# Patient Record
Sex: FEMALE | Race: WHITE | HISPANIC OR LATINO
[De-identification: names, ages, dates, MRNs, and addresses within clinical notes are randomized per-mention and may not be internally consistent; named-entity substitution may affect disease eponyms.]

---

## 2020-10-02 ENCOUNTER — APPOINTMENT (OUTPATIENT)
Dept: ANTEPARTUM | Facility: CLINIC | Age: 35
End: 2020-10-02
Payer: COMMERCIAL

## 2020-10-02 PROCEDURE — 76801 OB US < 14 WKS SINGLE FETUS: CPT

## 2020-10-02 PROCEDURE — 76813 OB US NUCHAL MEAS 1 GEST: CPT | Mod: 59

## 2020-10-29 ENCOUNTER — APPOINTMENT (OUTPATIENT)
Dept: ANTEPARTUM | Facility: CLINIC | Age: 35
End: 2020-10-29
Payer: COMMERCIAL

## 2020-10-29 PROCEDURE — 76817 TRANSVAGINAL US OBSTETRIC: CPT

## 2020-10-29 PROCEDURE — 76811 OB US DETAILED SNGL FETUS: CPT

## 2020-10-29 PROCEDURE — 99072 ADDL SUPL MATRL&STAF TM PHE: CPT

## 2020-12-03 ENCOUNTER — APPOINTMENT (OUTPATIENT)
Dept: ANTEPARTUM | Facility: CLINIC | Age: 35
End: 2020-12-03
Payer: COMMERCIAL

## 2020-12-03 PROCEDURE — 76816 OB US FOLLOW-UP PER FETUS: CPT

## 2020-12-03 PROCEDURE — 99072 ADDL SUPL MATRL&STAF TM PHE: CPT

## 2020-12-03 PROCEDURE — 76817 TRANSVAGINAL US OBSTETRIC: CPT

## 2021-01-21 ENCOUNTER — APPOINTMENT (OUTPATIENT)
Dept: ANTEPARTUM | Facility: CLINIC | Age: 36
End: 2021-01-21

## 2021-02-02 ENCOUNTER — APPOINTMENT (OUTPATIENT)
Dept: ANTEPARTUM | Facility: CLINIC | Age: 36
End: 2021-02-02
Payer: COMMERCIAL

## 2021-02-02 PROCEDURE — 76819 FETAL BIOPHYS PROFIL W/O NST: CPT

## 2021-02-02 PROCEDURE — 99072 ADDL SUPL MATRL&STAF TM PHE: CPT

## 2021-02-02 PROCEDURE — 76816 OB US FOLLOW-UP PER FETUS: CPT

## 2021-02-22 PROBLEM — Z00.00 ENCOUNTER FOR PREVENTIVE HEALTH EXAMINATION: Status: ACTIVE | Noted: 2021-02-22

## 2021-03-02 ENCOUNTER — APPOINTMENT (OUTPATIENT)
Dept: ANTEPARTUM | Facility: CLINIC | Age: 36
End: 2021-03-02
Payer: COMMERCIAL

## 2021-03-02 ENCOUNTER — ASOB RESULT (OUTPATIENT)
Age: 36
End: 2021-03-02

## 2021-03-02 PROCEDURE — 76819 FETAL BIOPHYS PROFIL W/O NST: CPT

## 2021-03-02 PROCEDURE — 76816 OB US FOLLOW-UP PER FETUS: CPT

## 2021-03-02 PROCEDURE — 99072 ADDL SUPL MATRL&STAF TM PHE: CPT

## 2021-03-17 ENCOUNTER — ASOB RESULT (OUTPATIENT)
Age: 36
End: 2021-03-17

## 2021-03-17 ENCOUNTER — APPOINTMENT (OUTPATIENT)
Dept: ANTEPARTUM | Facility: CLINIC | Age: 36
End: 2021-03-17
Payer: COMMERCIAL

## 2021-03-17 PROCEDURE — 76819 FETAL BIOPHYS PROFIL W/O NST: CPT

## 2021-03-17 PROCEDURE — 99072 ADDL SUPL MATRL&STAF TM PHE: CPT

## 2021-03-17 PROCEDURE — 76816 OB US FOLLOW-UP PER FETUS: CPT

## 2021-03-25 ENCOUNTER — ASOB RESULT (OUTPATIENT)
Age: 36
End: 2021-03-25

## 2021-03-25 ENCOUNTER — APPOINTMENT (OUTPATIENT)
Dept: ANTEPARTUM | Facility: CLINIC | Age: 36
End: 2021-03-25
Payer: COMMERCIAL

## 2021-03-25 PROCEDURE — 99072 ADDL SUPL MATRL&STAF TM PHE: CPT

## 2021-03-25 PROCEDURE — 76819 FETAL BIOPHYS PROFIL W/O NST: CPT

## 2021-03-25 PROCEDURE — 76816 OB US FOLLOW-UP PER FETUS: CPT

## 2021-03-31 ENCOUNTER — APPOINTMENT (OUTPATIENT)
Dept: ANTEPARTUM | Facility: CLINIC | Age: 36
End: 2021-03-31
Payer: COMMERCIAL

## 2021-03-31 ENCOUNTER — ASOB RESULT (OUTPATIENT)
Age: 36
End: 2021-03-31

## 2021-03-31 PROCEDURE — 76819 FETAL BIOPHYS PROFIL W/O NST: CPT

## 2021-03-31 PROCEDURE — 76816 OB US FOLLOW-UP PER FETUS: CPT

## 2021-03-31 PROCEDURE — 99072 ADDL SUPL MATRL&STAF TM PHE: CPT

## 2021-04-07 ENCOUNTER — APPOINTMENT (OUTPATIENT)
Dept: ANTEPARTUM | Facility: CLINIC | Age: 36
End: 2021-04-07
Payer: COMMERCIAL

## 2021-04-07 ENCOUNTER — ASOB RESULT (OUTPATIENT)
Age: 36
End: 2021-04-07

## 2021-04-07 PROCEDURE — 99072 ADDL SUPL MATRL&STAF TM PHE: CPT

## 2021-04-07 PROCEDURE — 76819 FETAL BIOPHYS PROFIL W/O NST: CPT

## 2021-04-07 PROCEDURE — 76816 OB US FOLLOW-UP PER FETUS: CPT

## 2021-04-09 ENCOUNTER — INPATIENT (INPATIENT)
Facility: HOSPITAL | Age: 36
LOS: 2 days | Discharge: HOME | End: 2021-04-12
Attending: OBSTETRICS & GYNECOLOGY | Admitting: OBSTETRICS & GYNECOLOGY
Payer: COMMERCIAL

## 2021-04-09 DIAGNOSIS — Z3A.00 WEEKS OF GESTATION OF PREGNANCY NOT SPECIFIED: ICD-10-CM

## 2021-04-09 DIAGNOSIS — O26.899 OTHER SPECIFIED PREGNANCY RELATED CONDITIONS, UNSPECIFIED TRIMESTER: ICD-10-CM

## 2021-04-10 ENCOUNTER — RESULT REVIEW (OUTPATIENT)
Age: 36
End: 2021-04-10

## 2021-04-10 ENCOUNTER — TRANSCRIPTION ENCOUNTER (OUTPATIENT)
Age: 36
End: 2021-04-10

## 2021-04-10 VITALS — HEIGHT: 67 IN | WEIGHT: 189.6 LBS

## 2021-04-10 LAB
ALBUMIN SERPL ELPH-MCNC: 3.8 G/DL — SIGNIFICANT CHANGE UP (ref 3.3–5)
ALP SERPL-CCNC: 135 U/L — HIGH (ref 40–120)
ALT FLD-CCNC: 9 U/L — LOW (ref 10–45)
ANION GAP SERPL CALC-SCNC: 14 MMOL/L — SIGNIFICANT CHANGE UP (ref 5–17)
APTT BLD: 24.1 SEC — LOW (ref 27.5–35.5)
AST SERPL-CCNC: 16 U/L — SIGNIFICANT CHANGE UP (ref 10–40)
BASOPHILS # BLD AUTO: 0.05 K/UL — SIGNIFICANT CHANGE UP (ref 0–0.2)
BASOPHILS NFR BLD AUTO: 0.3 % — SIGNIFICANT CHANGE UP (ref 0–2)
BILIRUB SERPL-MCNC: 0.5 MG/DL — SIGNIFICANT CHANGE UP (ref 0.2–1.2)
BLD GP AB SCN SERPL QL: NEGATIVE — SIGNIFICANT CHANGE UP
BUN SERPL-MCNC: 5 MG/DL — LOW (ref 7–23)
CALCIUM SERPL-MCNC: 9 MG/DL — SIGNIFICANT CHANGE UP (ref 8.4–10.5)
CHLORIDE SERPL-SCNC: 102 MMOL/L — SIGNIFICANT CHANGE UP (ref 96–108)
CO2 SERPL-SCNC: 19 MMOL/L — LOW (ref 22–31)
COVID-19 SPIKE DOMAIN AB INTERP: POSITIVE
COVID-19 SPIKE DOMAIN ANTIBODY RESULT: 239 U/ML — HIGH
CREAT ?TM UR-MCNC: 226 MG/DL — SIGNIFICANT CHANGE UP
CREAT SERPL-MCNC: 0.64 MG/DL — SIGNIFICANT CHANGE UP (ref 0.5–1.3)
EOSINOPHIL # BLD AUTO: 0.02 K/UL — SIGNIFICANT CHANGE UP (ref 0–0.5)
EOSINOPHIL NFR BLD AUTO: 0.1 % — SIGNIFICANT CHANGE UP (ref 0–6)
FIBRINOGEN PPP-MCNC: 581 MG/DL — HIGH (ref 258–438)
GLUCOSE SERPL-MCNC: 109 MG/DL — HIGH (ref 70–99)
HCT VFR BLD CALC: 36.8 % — SIGNIFICANT CHANGE UP (ref 34.5–45)
HGB BLD-MCNC: 11.9 G/DL — SIGNIFICANT CHANGE UP (ref 11.5–15.5)
IMM GRANULOCYTES NFR BLD AUTO: 0.5 % — SIGNIFICANT CHANGE UP (ref 0–1.5)
INR BLD: 0.96 — SIGNIFICANT CHANGE UP (ref 0.88–1.16)
LDH SERPL L TO P-CCNC: 206 U/L — SIGNIFICANT CHANGE UP (ref 50–242)
LYMPHOCYTES # BLD AUTO: 11.7 % — LOW (ref 13–44)
LYMPHOCYTES # BLD AUTO: 2.33 K/UL — SIGNIFICANT CHANGE UP (ref 1–3.3)
MCHC RBC-ENTMCNC: 26.1 PG — LOW (ref 27–34)
MCHC RBC-ENTMCNC: 32.3 GM/DL — SIGNIFICANT CHANGE UP (ref 32–36)
MCV RBC AUTO: 80.7 FL — SIGNIFICANT CHANGE UP (ref 80–100)
MONOCYTES # BLD AUTO: 0.88 K/UL — SIGNIFICANT CHANGE UP (ref 0–0.9)
MONOCYTES NFR BLD AUTO: 4.4 % — SIGNIFICANT CHANGE UP (ref 2–14)
NEUTROPHILS # BLD AUTO: 16.59 K/UL — HIGH (ref 1.8–7.4)
NEUTROPHILS NFR BLD AUTO: 83 % — HIGH (ref 43–77)
NRBC # BLD: 0 /100 WBCS — SIGNIFICANT CHANGE UP (ref 0–0)
PLATELET # BLD AUTO: 396 K/UL — SIGNIFICANT CHANGE UP (ref 150–400)
POTASSIUM SERPL-MCNC: 3.4 MMOL/L — LOW (ref 3.5–5.3)
POTASSIUM SERPL-SCNC: 3.4 MMOL/L — LOW (ref 3.5–5.3)
PROT ?TM UR-MCNC: 22 MG/DL — HIGH (ref 0–12)
PROT SERPL-MCNC: 8 G/DL — SIGNIFICANT CHANGE UP (ref 6–8.3)
PROT/CREAT UR-RTO: 0.1 RATIO — SIGNIFICANT CHANGE UP (ref 0–0.2)
PROTHROM AB SERPL-ACNC: 11.5 SEC — SIGNIFICANT CHANGE UP (ref 10.6–13.6)
RBC # BLD: 4.56 M/UL — SIGNIFICANT CHANGE UP (ref 3.8–5.2)
RBC # FLD: 13.9 % — SIGNIFICANT CHANGE UP (ref 10.3–14.5)
RH IG SCN BLD-IMP: POSITIVE — SIGNIFICANT CHANGE UP
RH IG SCN BLD-IMP: POSITIVE — SIGNIFICANT CHANGE UP
SARS-COV-2 IGG+IGM SERPL QL IA: 239 U/ML — HIGH
SARS-COV-2 IGG+IGM SERPL QL IA: POSITIVE
SODIUM SERPL-SCNC: 135 MMOL/L — SIGNIFICANT CHANGE UP (ref 135–145)
T PALLIDUM AB TITR SER: NEGATIVE — SIGNIFICANT CHANGE UP
URATE SERPL-MCNC: 5.9 MG/DL — SIGNIFICANT CHANGE UP (ref 2.5–7)
WBC # BLD: 19.96 K/UL — HIGH (ref 3.8–10.5)
WBC # FLD AUTO: 19.96 K/UL — HIGH (ref 3.8–10.5)

## 2021-04-10 PROCEDURE — 88307 TISSUE EXAM BY PATHOLOGIST: CPT | Mod: 26

## 2021-04-10 RX ORDER — SODIUM CHLORIDE 9 MG/ML
1000 INJECTION, SOLUTION INTRAVENOUS ONCE
Refills: 0 | Status: COMPLETED | OUTPATIENT
Start: 2021-04-10 | End: 2021-04-10

## 2021-04-10 RX ORDER — AER TRAVELER 0.5 G/1
1 SOLUTION RECTAL; TOPICAL EVERY 4 HOURS
Refills: 0 | Status: DISCONTINUED | OUTPATIENT
Start: 2021-04-10 | End: 2021-04-12

## 2021-04-10 RX ORDER — MAGNESIUM HYDROXIDE 400 MG/1
30 TABLET, CHEWABLE ORAL
Refills: 0 | Status: DISCONTINUED | OUTPATIENT
Start: 2021-04-10 | End: 2021-04-12

## 2021-04-10 RX ORDER — GENTAMICIN SULFATE 40 MG/ML
430 VIAL (ML) INJECTION ONCE
Refills: 0 | Status: DISCONTINUED | OUTPATIENT
Start: 2021-04-10 | End: 2021-04-10

## 2021-04-10 RX ORDER — AMPICILLIN TRIHYDRATE 250 MG
CAPSULE ORAL
Refills: 0 | Status: DISCONTINUED | OUTPATIENT
Start: 2021-04-10 | End: 2021-04-11

## 2021-04-10 RX ORDER — BENZOCAINE 10 %
1 GEL (GRAM) MUCOUS MEMBRANE EVERY 6 HOURS
Refills: 0 | Status: DISCONTINUED | OUTPATIENT
Start: 2021-04-10 | End: 2021-04-12

## 2021-04-10 RX ORDER — IBUPROFEN 200 MG
600 TABLET ORAL EVERY 6 HOURS
Refills: 0 | Status: DISCONTINUED | OUTPATIENT
Start: 2021-04-10 | End: 2021-04-12

## 2021-04-10 RX ORDER — HYDROCORTISONE 1 %
1 OINTMENT (GRAM) TOPICAL EVERY 6 HOURS
Refills: 0 | Status: DISCONTINUED | OUTPATIENT
Start: 2021-04-10 | End: 2021-04-12

## 2021-04-10 RX ORDER — SODIUM CHLORIDE 9 MG/ML
1000 INJECTION, SOLUTION INTRAVENOUS
Refills: 0 | Status: DISCONTINUED | OUTPATIENT
Start: 2021-04-10 | End: 2021-04-10

## 2021-04-10 RX ORDER — GENTAMICIN SULFATE 40 MG/ML
350 VIAL (ML) INJECTION ONCE
Refills: 0 | Status: COMPLETED | OUTPATIENT
Start: 2021-04-10 | End: 2021-04-10

## 2021-04-10 RX ORDER — AMPICILLIN TRIHYDRATE 250 MG
2 CAPSULE ORAL EVERY 6 HOURS
Refills: 0 | Status: DISCONTINUED | OUTPATIENT
Start: 2021-04-10 | End: 2021-04-11

## 2021-04-10 RX ORDER — KETOROLAC TROMETHAMINE 30 MG/ML
30 SYRINGE (ML) INJECTION ONCE
Refills: 0 | Status: DISCONTINUED | OUTPATIENT
Start: 2021-04-10 | End: 2021-04-10

## 2021-04-10 RX ORDER — SIMETHICONE 80 MG/1
80 TABLET, CHEWABLE ORAL EVERY 4 HOURS
Refills: 0 | Status: DISCONTINUED | OUTPATIENT
Start: 2021-04-10 | End: 2021-04-12

## 2021-04-10 RX ORDER — DIPHENHYDRAMINE HCL 50 MG
25 CAPSULE ORAL EVERY 6 HOURS
Refills: 0 | Status: DISCONTINUED | OUTPATIENT
Start: 2021-04-10 | End: 2021-04-12

## 2021-04-10 RX ORDER — IBUPROFEN 200 MG
1 TABLET ORAL
Qty: 0 | Refills: 0 | DISCHARGE
Start: 2021-04-10

## 2021-04-10 RX ORDER — AMPICILLIN TRIHYDRATE 250 MG
2 CAPSULE ORAL ONCE
Refills: 0 | Status: COMPLETED | OUTPATIENT
Start: 2021-04-10 | End: 2021-04-10

## 2021-04-10 RX ORDER — FENTANYL/BUPIVACAINE/NS/PF 2MCG/ML-.1
250 PLASTIC BAG, INJECTION (ML) INJECTION
Refills: 0 | Status: DISCONTINUED | OUTPATIENT
Start: 2021-04-10 | End: 2021-04-10

## 2021-04-10 RX ORDER — SODIUM CHLORIDE 9 MG/ML
3 INJECTION INTRAMUSCULAR; INTRAVENOUS; SUBCUTANEOUS EVERY 8 HOURS
Refills: 0 | Status: DISCONTINUED | OUTPATIENT
Start: 2021-04-10 | End: 2021-04-12

## 2021-04-10 RX ORDER — IBUPROFEN 200 MG
600 TABLET ORAL EVERY 6 HOURS
Refills: 0 | Status: COMPLETED | OUTPATIENT
Start: 2021-04-10 | End: 2022-03-09

## 2021-04-10 RX ORDER — TETANUS TOXOID, REDUCED DIPHTHERIA TOXOID AND ACELLULAR PERTUSSIS VACCINE, ADSORBED 5; 2.5; 8; 8; 2.5 [IU]/.5ML; [IU]/.5ML; UG/.5ML; UG/.5ML; UG/.5ML
0.5 SUSPENSION INTRAMUSCULAR ONCE
Refills: 0 | Status: DISCONTINUED | OUTPATIENT
Start: 2021-04-10 | End: 2021-04-12

## 2021-04-10 RX ORDER — ACETAMINOPHEN 500 MG
975 TABLET ORAL
Refills: 0 | Status: DISCONTINUED | OUTPATIENT
Start: 2021-04-10 | End: 2021-04-12

## 2021-04-10 RX ORDER — OXYTOCIN 10 UNIT/ML
333.33 VIAL (ML) INJECTION
Qty: 20 | Refills: 0 | Status: DISCONTINUED | OUTPATIENT
Start: 2021-04-10 | End: 2021-04-12

## 2021-04-10 RX ORDER — BENZOCAINE 10 %
1 GEL (GRAM) MUCOUS MEMBRANE
Qty: 0 | Refills: 0 | DISCHARGE
Start: 2021-04-10

## 2021-04-10 RX ORDER — OXYTOCIN 10 UNIT/ML
333.33 VIAL (ML) INJECTION
Qty: 20 | Refills: 0 | Status: DISCONTINUED | OUTPATIENT
Start: 2021-04-10 | End: 2021-04-10

## 2021-04-10 RX ORDER — ACETAMINOPHEN 500 MG
3 TABLET ORAL
Qty: 0 | Refills: 0 | DISCHARGE
Start: 2021-04-10

## 2021-04-10 RX ORDER — CITRIC ACID/SODIUM CITRATE 300-500 MG
15 SOLUTION, ORAL ORAL EVERY 6 HOURS
Refills: 0 | Status: DISCONTINUED | OUTPATIENT
Start: 2021-04-10 | End: 2021-04-10

## 2021-04-10 RX ORDER — LANOLIN
1 OINTMENT (GRAM) TOPICAL EVERY 6 HOURS
Refills: 0 | Status: DISCONTINUED | OUTPATIENT
Start: 2021-04-10 | End: 2021-04-12

## 2021-04-10 RX ORDER — ENOXAPARIN SODIUM 100 MG/ML
40 INJECTION SUBCUTANEOUS EVERY 24 HOURS
Refills: 0 | Status: DISCONTINUED | OUTPATIENT
Start: 2021-04-10 | End: 2021-04-12

## 2021-04-10 RX ORDER — PRAMOXINE HYDROCHLORIDE 150 MG/15G
1 AEROSOL, FOAM RECTAL EVERY 4 HOURS
Refills: 0 | Status: DISCONTINUED | OUTPATIENT
Start: 2021-04-10 | End: 2021-04-12

## 2021-04-10 RX ORDER — DIBUCAINE 1 %
1 OINTMENT (GRAM) RECTAL EVERY 6 HOURS
Refills: 0 | Status: DISCONTINUED | OUTPATIENT
Start: 2021-04-10 | End: 2021-04-12

## 2021-04-10 RX ADMIN — Medication 975 MILLIGRAM(S): at 22:00

## 2021-04-10 RX ADMIN — Medication 1 SPRAY(S): at 21:14

## 2021-04-10 RX ADMIN — Medication 975 MILLIGRAM(S): at 09:32

## 2021-04-10 RX ADMIN — SODIUM CHLORIDE 3 MILLILITER(S): 9 INJECTION INTRAMUSCULAR; INTRAVENOUS; SUBCUTANEOUS at 22:00

## 2021-04-10 RX ADMIN — Medication 1000 MILLIUNIT(S)/MIN: at 09:32

## 2021-04-10 RX ADMIN — SODIUM CHLORIDE 125 MILLILITER(S): 9 INJECTION, SOLUTION INTRAVENOUS at 03:00

## 2021-04-10 RX ADMIN — Medication 600 MILLIGRAM(S): at 18:38

## 2021-04-10 RX ADMIN — Medication 975 MILLIGRAM(S): at 16:42

## 2021-04-10 RX ADMIN — SODIUM CHLORIDE 2000 MILLILITER(S): 9 INJECTION, SOLUTION INTRAVENOUS at 01:15

## 2021-04-10 RX ADMIN — Medication 200 MILLIGRAM(S): at 10:57

## 2021-04-10 RX ADMIN — Medication 216 GRAM(S): at 22:43

## 2021-04-10 RX ADMIN — AER TRAVELER 1 APPLICATION(S): 0.5 SOLUTION RECTAL; TOPICAL at 21:13

## 2021-04-10 RX ADMIN — Medication 975 MILLIGRAM(S): at 21:12

## 2021-04-10 RX ADMIN — Medication 975 MILLIGRAM(S): at 15:48

## 2021-04-10 RX ADMIN — Medication 975 MILLIGRAM(S): at 10:57

## 2021-04-10 RX ADMIN — Medication 216 GRAM(S): at 15:49

## 2021-04-10 RX ADMIN — Medication 600 MILLIGRAM(S): at 19:15

## 2021-04-10 RX ADMIN — Medication 216 GRAM(S): at 10:10

## 2021-04-10 RX ADMIN — Medication 250 MILLILITER(S): at 02:00

## 2021-04-10 RX ADMIN — SODIUM CHLORIDE 3 MILLILITER(S): 9 INJECTION INTRAMUSCULAR; INTRAVENOUS; SUBCUTANEOUS at 14:51

## 2021-04-10 RX ADMIN — Medication 1 APPLICATION(S): at 21:14

## 2021-04-10 NOTE — DISCHARGE NOTE OB - HOSPITAL COURSE
Uneventful postpartum course Patient had nonsurgical vaginal delivery complicated by gestational hypertension and chorioamnionitis treated with IV ampicillin and gentamicin.  Please see delivery note for details.  During postpartum course patient's vitals were stable, vaginal bleeding appropriate, and pain well controlled.  On day of discharge patient was ambulating, her pain controlled with oral medications, had adequate oral intake, and was voiding freely.  Discharge instructions and precautions were given.  Will return to clinic in 6 weeks for postpartum visit.

## 2021-04-10 NOTE — DISCHARGE NOTE OB - PATIENT PORTAL LINK FT
You can access the FollowMyHealth Patient Portal offered by [facility] by registering at the following website: [email or web address]. By joining [employer]’s FollowMyHealth portal, you will also be able to view your health information using other applications (apps) compatible with our system.

## 2021-04-10 NOTE — DISCHARGE NOTE OB - MEDICATION SUMMARY - MEDICATIONS TO TAKE
I will START or STAY ON the medications listed below when I get home from the hospital:    acetaminophen 325 mg oral tablet  -- 3 tab(s) by mouth   -- Indication: For Pain    ibuprofen 600 mg oral tablet  -- 1 tab(s) by mouth every 6 hours  -- Indication: For Pain    benzocaine 20% topical spray  -- 1 spray(s) on skin every 6 hours, As needed, for Perineal discomfort  -- Indication: For Pain    Prenatal Multivitamins with Folic Acid 1 mg oral tablet  -- 1 tab(s) by mouth once a day  -- Indication: For PREGNANCY

## 2021-04-10 NOTE — LACTATION INITIAL EVALUATION - MILK SUPPLY
small amounto fcolostrum expressed from right breast but not collectible, unable to express colostrum from left breast small amount of colostrum expressed from right breast but not collectible, unable to express colostrum from left breast

## 2021-04-10 NOTE — LACTATION INITIAL EVALUATION - NS LACT CON REASON FOR REQ
general questions without assessment/compromised infant pt. is a P1 at/general questions without assessment/compromised infant Pt. is a P1 at 39.3 wks. gestation via vaginal delivery.  Pt. denies medical problems during the pregnancy.  Baby admitted to [facility]/general questions without assessment/compromised infant Pt. is a P1 at 39.3 wks. gestation via vaginal delivery.  Pt. denies medical problems during the pregnancy.  Baby admitted to [facility] for respiratory distress.  Discussed with pt. hand expression to provide stimulation to breasts.  Pt. stated while in L+D nurse attempted to express colostrum.  Pt. stated only a small drop was expressed.  Reviewed hand expression and small amount of colostrum expressed from right breast unable to express colsotrum from left breast.  Discussed use of hand pump during the first 24 hrs., if pt. not comfortable with hand expressing.  Instructed pt. on use of hand pump and pt. stated she will attempt pumping after she rests.  Pt. understands she should provide stimulation to breasts ( hand expression/pumping) every three hrs./general questions without assessment/compromised infant

## 2021-04-10 NOTE — DISCHARGE NOTE OB - CARE PLAN
Principal Discharge DX:	Pregnancy, unspecified gestational age  Goal:	feel well :)  Assessment and plan of treatment:	Vaginal delivery, meeting all postpartum milestones.  Follow up in 6 weeks.   Principal Discharge DX:	Pregnancy, unspecified gestational age  Goal:	feel well :)  Assessment and plan of treatment:	Vaginal delivery, meeting all postpartum milestones.  Follow up in 6 weeks.  Secondary Diagnosis:	Gestational hypertension, third trimester  Goal:	Good blood pressure control  Assessment and plan of treatment:	Monitor blood pressure twice daily.  Document blood pressures to review with obstetrician at follow-up appointment.  Call doctor for persistent systolic blood pressure (top number) equal or greater to 150 AND/OR diastolic blood pressure (bottom number) equal or above 100.      Return to hospital for systolic blood pressure (top number) equal to or greater than 160 AND/OR diastolic blood pressure (bottom number) equal to or greater than 110 OR any of the following symptoms: changes in vision, headache not relieved with Tylenol, severe abdominal pain, vomiting, increased vaginal bleeding, chest pain, or shortness of breath.

## 2021-04-10 NOTE — DISCHARGE NOTE OB - MEDICATION SUMMARY - MEDICATIONS TO CHANGE
**To be called on 4/13/18  S/p Abbott SCS trial on 4/12/18 by FQ  Lead removal scheduled for 4/18/18 to arrive at 1:45 for 2 pm appt with HA  I will SWITCH the dose or number of times a day I take the medications listed below when I get home from the hospital:  None

## 2021-04-10 NOTE — DISCHARGE NOTE OB - CARE PROVIDER_API CALL
Edna Pollard  OBSTETRICS AND GYNECOLOGY  [address] [place] [address]  Phone: [phone]  Fax: [phone]  Follow Up Time: 1 month

## 2021-04-11 LAB
HCT VFR BLD CALC: 31 % — LOW (ref 34.5–45)
HGB BLD-MCNC: 9.7 G/DL — LOW (ref 11.5–15.5)

## 2021-04-11 RX ADMIN — Medication 216 GRAM(S): at 05:24

## 2021-04-11 RX ADMIN — Medication 600 MILLIGRAM(S): at 06:11

## 2021-04-11 RX ADMIN — Medication 600 MILLIGRAM(S): at 07:00

## 2021-04-11 RX ADMIN — Medication 600 MILLIGRAM(S): at 11:48

## 2021-04-11 RX ADMIN — Medication 1 TABLET(S): at 11:47

## 2021-04-11 RX ADMIN — Medication 600 MILLIGRAM(S): at 00:27

## 2021-04-11 RX ADMIN — Medication 600 MILLIGRAM(S): at 12:48

## 2021-04-11 RX ADMIN — Medication 600 MILLIGRAM(S): at 01:09

## 2021-04-11 RX ADMIN — SODIUM CHLORIDE 3 MILLILITER(S): 9 INJECTION INTRAMUSCULAR; INTRAVENOUS; SUBCUTANEOUS at 15:56

## 2021-04-11 RX ADMIN — SODIUM CHLORIDE 3 MILLILITER(S): 9 INJECTION INTRAMUSCULAR; INTRAVENOUS; SUBCUTANEOUS at 06:15

## 2021-04-12 VITALS
RESPIRATION RATE: 17 BRPM | TEMPERATURE: 98 F | OXYGEN SATURATION: 98 % | DIASTOLIC BLOOD PRESSURE: 66 MMHG | SYSTOLIC BLOOD PRESSURE: 121 MMHG | HEART RATE: 66 BPM

## 2021-04-12 PROCEDURE — 85610 PROTHROMBIN TIME: CPT

## 2021-04-12 PROCEDURE — 85730 THROMBOPLASTIN TIME PARTIAL: CPT

## 2021-04-12 PROCEDURE — 84550 ASSAY OF BLOOD/URIC ACID: CPT

## 2021-04-12 PROCEDURE — 99214 OFFICE O/P EST MOD 30 MIN: CPT

## 2021-04-12 PROCEDURE — 85384 FIBRINOGEN ACTIVITY: CPT

## 2021-04-12 PROCEDURE — 85014 HEMATOCRIT: CPT

## 2021-04-12 PROCEDURE — 86901 BLOOD TYPING SEROLOGIC RH(D): CPT

## 2021-04-12 PROCEDURE — 82570 ASSAY OF URINE CREATININE: CPT

## 2021-04-12 PROCEDURE — 83615 LACTATE (LD) (LDH) ENZYME: CPT

## 2021-04-12 PROCEDURE — 80053 COMPREHEN METABOLIC PANEL: CPT

## 2021-04-12 PROCEDURE — 88307 TISSUE EXAM BY PATHOLOGIST: CPT

## 2021-04-12 PROCEDURE — 86900 BLOOD TYPING SEROLOGIC ABO: CPT

## 2021-04-12 PROCEDURE — 59050 FETAL MONITOR W/REPORT: CPT

## 2021-04-12 PROCEDURE — 84156 ASSAY OF PROTEIN URINE: CPT

## 2021-04-12 PROCEDURE — 36415 COLL VENOUS BLD VENIPUNCTURE: CPT

## 2021-04-12 PROCEDURE — 86780 TREPONEMA PALLIDUM: CPT

## 2021-04-12 PROCEDURE — 86850 RBC ANTIBODY SCREEN: CPT

## 2021-04-12 PROCEDURE — 86769 SARS-COV-2 COVID-19 ANTIBODY: CPT

## 2021-04-12 PROCEDURE — 85025 COMPLETE CBC W/AUTO DIFF WBC: CPT

## 2021-04-12 PROCEDURE — 85018 HEMOGLOBIN: CPT

## 2021-04-12 RX ADMIN — Medication 600 MILLIGRAM(S): at 01:00

## 2021-04-12 RX ADMIN — Medication 600 MILLIGRAM(S): at 00:07

## 2021-04-12 NOTE — PROGRESS NOTE ADULT - SUBJECTIVE AND OBJECTIVE BOX
Patient evaluated at bedside.   She reports pain is well controlled with OPM  She denies headache, dizziness, chest pain, palpitations, shortness of breath, nausea, vomiting, heavy vaginal bleeding or perineal discomfort.  She has been ambulating without assistance, voiding spontaneously, and is breastfeeding.    Physical Exam:  T(C): 36.5 (21 @ 05:58), Max: 36.7 (04-10-21 @ 21:46)  HR: 70 (21 @ 05:58) (70 - 83)  BP: 97/63 (21 @ 05:58) (97/63 - 109/64)  RR: 18 (21 @ 05:58) (18 - 18)  SpO2: 98% (21 @ 05:58) (98% - 98%)    GA: NAD, A+0 x 3  Breasts: soft, nontender, no palpable masses  Abd: + BS, soft, nontender, nondistended, no rebound or guarding, uterus firm at midline, 2  fb below umbilicus  : lochia WNL  Extremities: no swelling or calf tenderness                            9.7    x     )-----------( x        ( 2021 06:19 )             31.0     04-10    135  |  102  |  5<L>  ----------------------------<  109<H>  3.4<L>   |  19<L>  |  0.64    Ca    9.0      10 Apr 2021 00:55    TPro  8.0  /  Alb  3.8  /  TBili  0.5  /  DBili  x   /  AST  16  /  ALT  9<L>  /  AlkPhos  135<H>  -10    A/P 35y s/p , PPD #1  ,stable  1. Pain: well controlled on OPM  2. GI: Regular diet  3. : s.p tyson  4. DVT prophylaxis: SCDs, ambulate  5. Dispo: PPD 2, unless otherwise specified  
Patient evaluated at bedside.   She reports pain is well controlled with OPM  She denies headache, dizziness, chest pain, palpitations, shortness of breathe, nausea, vomiting, heavy vaginal bleeding or perineal discomfort.  She has been ambulating without assistance, voiding spontaneously, and is breastfeeding.    Physical Exam:  T(C): 36.8 (04-12-21 @ 08:55), Max: 36.9 (04-12-21 @ 02:13)  HR: 66 (04-12-21 @ 08:55) (60 - 66)  BP: 121/66 (04-12-21 @ 08:55) (120/69 - 121/66)  RR: 17 (04-12-21 @ 08:55) (17 - 18)  SpO2: 98% (04-12-21 @ 08:55) (98% - 98%)    GA: NAD, A+0 x 3  Breasts: soft, nontender, no palpable masses  Abd: + BS, soft, nontender, nondistended, no rebound or guarding, uterus firm at midline, 2  fb below umbilicus  : lochia WNL  Extremities: no swelling or calf tenderness                            9.7    x     )-----------( x        ( 11 Apr 2021 06:19 )             31.0         
Patient evaluated at bedside.  No acute events overnight.    She reports pain is well controlled with medications.     She has been ambulating without assistance and is voiding spontaneously. Reports decrease in vaginal bleeding and denies clots.     She denies HA, dizziness, chest pain, palpitations, shortness of breath, n/v, heavy vaginal bleeding or perineal discomfort.    Physical Exam:  Vital Signs Last 24 Hrs  T(C): 36.6 (12 Apr 2021 06:00), Max: 36.9 (12 Apr 2021 02:13)  T(F): 97.9 (12 Apr 2021 06:00), Max: 98.5 (12 Apr 2021 02:13)  HR: 60 (12 Apr 2021 06:00) (60 - 83)  BP: 120/69 (12 Apr 2021 06:00) (107/68 - 127/83)  BP(mean): 86 (12 Apr 2021 06:00) (86 - 86)  RR: 18 (12 Apr 2021 06:00) (16 - 18)  SpO2: 98% (12 Apr 2021 06:00) (97% - 99%)    GA: NAD, A+0 x 3  Abd: + BS, soft, appropriately tender, nondistended, no rebound or guarding, uterus firm at midline  : lochia WNL  Extremities: no edema or calf tenderness                          9.7    x     )-----------( x        ( 11 Apr 2021 06:19 )             31.0               
Patient evaluated at bedside.      She reports pain is well controlled with medications.     She has been ambulating without assistance, voiding spontaneously, tolerating solid food and PO fluids.     She denies HA, vision changes, dizziness, chest pain, palpitations, shortness of breath, n/v, ruq/epigastric pain, heavy vaginal bleeding or perineal discomfort.    Physical Exam:  Vital Signs Last 24 Hrs  T(C): 36.5 (11 Apr 2021 05:58), Max: 39 (10 Apr 2021 09:30)  T(F): 97.7 (11 Apr 2021 05:58), Max: 102.2 (10 Apr 2021 09:30)  HR: 70 (11 Apr 2021 05:58) (70 - 134)  BP: 97/63 (11 Apr 2021 05:58) (97/63 - 134/72)  RR: 18 (11 Apr 2021 05:58) (18 - 18)  SpO2: 98% (11 Apr 2021 05:58) (98% - 99%)    GA: NAD  Abd: + BS, soft, nontender, nondistended, no rebound or guarding, uterus firm at midline below the umbilicus  : lochia WNL  Extremities: no calf tenderness                          9.7    x     )-----------( x        ( 11 Apr 2021 06:19 )             31.0     04-10    135  |  102  |  5<L>  ----------------------------<  109<H>  3.4<L>   |  19<L>  |  0.64    Ca    9.0      10 Apr 2021 00:55    TPro  8.0  /  Alb  3.8  /  TBili  0.5  /  DBili  x   /  AST  16  /  ALT  9<L>  /  AlkPhos  135<H>  04-10    PT/INR - ( 10 Apr 2021 00:55 )   PT: 11.5 sec;   INR: 0.96          PTT - ( 10 Apr 2021 00:55 )  PTT:24.1 sec

## 2021-04-14 ENCOUNTER — APPOINTMENT (OUTPATIENT)
Dept: ANTEPARTUM | Facility: CLINIC | Age: 36
End: 2021-04-14

## 2021-04-14 LAB — SURGICAL PATHOLOGY STUDY: SIGNIFICANT CHANGE UP

## 2021-04-16 DIAGNOSIS — Z86.19 PERSONAL HISTORY OF OTHER INFECTIOUS AND PARASITIC DISEASES: ICD-10-CM

## 2021-04-16 DIAGNOSIS — Z3A.39 39 WEEKS GESTATION OF PREGNANCY: ICD-10-CM

## 2021-04-16 DIAGNOSIS — B00.1 HERPESVIRAL VESICULAR DERMATITIS: ICD-10-CM

## 2021-04-16 DIAGNOSIS — O41.1230 CHORIOAMNIONITIS, THIRD TRIMESTER, NOT APPLICABLE OR UNSPECIFIED: ICD-10-CM

## 2021-04-16 DIAGNOSIS — O26.893 OTHER SPECIFIED PREGNANCY RELATED CONDITIONS, THIRD TRIMESTER: ICD-10-CM

## 2025-03-05 NOTE — DISCHARGE NOTE OB - ABILITY TO HEAR (WITH HEARING AID OR HEARING APPLIANCE IF NORMALLY USED):
[facility] EMERGENCY DEPT  EMERGENCY DEPARTMENT HISTORY AND PHYSICAL EXAM      Date: 3/5/2025  Patient Name: Rochelle Mao  MRN: [id number]  YOB: 1995  Date of evaluation: 3/5/2025  Provider: Meri Mattson PA-C   Note Started: 2:50 PM EST 3/5/25    HISTORY OF PRESENT ILLNESS     Chief Complaint   Patient presents with    Laceration    Wrist Injury       History Provided By: Patient    HPI: Rochelle Mao is a 29 y.o. female presents to the ED for laceration sustained to left inner wrist approximately 2 hours PTA when knife slipped while trying to cut open a bag of food at work.  Patient's coworker wrapped the wrist to control the bleeding.  Patient not on any anticoagulation.  She denies any pain, numbness, tingling, or weakness.  Unknown last tetanus shot.    PAST MEDICAL HISTORY   Past Medical History:  Past Medical History:   Diagnosis Date    Headache        Past Surgical History:  Past Surgical History:   Procedure Laterality Date    TUBAL LIGATION         Family History:  Family History   Problem Relation Age of Onset    Diabetes Father     Diabetes Maternal Uncle     Diabetes Maternal Grandmother     Diabetes Paternal Grandmother     COPD Paternal Grandmother     Ovarian Cancer Paternal Grandmother     Breast Cancer Other        Social History:  Social History     Tobacco Use    Smoking status: Every Day     Types: Cigarettes     Start date: 2010    Smokeless tobacco: Never   Vaping Use    Vaping status: Never Used   Substance Use Topics    Alcohol use: Yes    Drug use: Not Currently       Allergies:  Allergies   Allergen Reactions    Shellfish Allergy Hives       PCP: Olga Yañez MD    Current Meds:   No current facility-administered medications for this encounter.     Current Outpatient Medications   Medication Sig Dispense Refill    cephALEXin (KEFLEX) 500 MG capsule Take 1 capsule by mouth 2 times daily      naproxen (NAPROSYN) 500 MG tablet Take 1 tablet by mouth 2 times daily as needed for  Adequate: hears normal conversation without difficulty